# Patient Record
Sex: FEMALE | Race: WHITE | NOT HISPANIC OR LATINO
[De-identification: names, ages, dates, MRNs, and addresses within clinical notes are randomized per-mention and may not be internally consistent; named-entity substitution may affect disease eponyms.]

---

## 2018-05-24 ENCOUNTER — RESULT REVIEW (OUTPATIENT)
Age: 23
End: 2018-05-24

## 2019-01-03 ENCOUNTER — OUTPATIENT (OUTPATIENT)
Dept: OUTPATIENT SERVICES | Facility: HOSPITAL | Age: 24
LOS: 1 days | End: 2019-01-03

## 2019-01-03 VITALS
HEIGHT: 60 IN | RESPIRATION RATE: 17 BRPM | WEIGHT: 98.11 LBS | DIASTOLIC BLOOD PRESSURE: 64 MMHG | SYSTOLIC BLOOD PRESSURE: 100 MMHG | TEMPERATURE: 99 F | HEART RATE: 88 BPM

## 2019-01-03 DIAGNOSIS — O02.1 MISSED ABORTION: ICD-10-CM

## 2019-01-03 LAB
BLD GP AB SCN SERPL QL: NEGATIVE — SIGNIFICANT CHANGE UP
HCT VFR BLD CALC: 36.7 % — SIGNIFICANT CHANGE UP (ref 34.5–45)
HGB BLD-MCNC: 12.2 G/DL — SIGNIFICANT CHANGE UP (ref 11.5–15.5)
MCHC RBC-ENTMCNC: 31.2 PG — SIGNIFICANT CHANGE UP (ref 27–34)
MCHC RBC-ENTMCNC: 33.2 % — SIGNIFICANT CHANGE UP (ref 32–36)
MCV RBC AUTO: 93.9 FL — SIGNIFICANT CHANGE UP (ref 80–100)
NRBC # FLD: 0 — SIGNIFICANT CHANGE UP
PLATELET # BLD AUTO: 233 K/UL — SIGNIFICANT CHANGE UP (ref 150–400)
PMV BLD: 11.5 FL — SIGNIFICANT CHANGE UP (ref 7–13)
RBC # BLD: 3.91 M/UL — SIGNIFICANT CHANGE UP (ref 3.8–5.2)
RBC # FLD: 12.1 % — SIGNIFICANT CHANGE UP (ref 10.3–14.5)
RH IG SCN BLD-IMP: POSITIVE — SIGNIFICANT CHANGE UP
WBC # BLD: 8.59 K/UL — SIGNIFICANT CHANGE UP (ref 3.8–10.5)
WBC # FLD AUTO: 8.59 K/UL — SIGNIFICANT CHANGE UP (ref 3.8–10.5)

## 2019-01-03 NOTE — H&P PST ADULT - PROBLEM SELECTOR PLAN 1
Scheduled for DVC on 01/07/2019. Pre op instructions, famotidine given and explained. Pt verbalized understanding.

## 2019-01-03 NOTE — H&P PST ADULT - HISTORY OF PRESENT ILLNESS
22y/o  female 9 weeks pregnant, LMP 10/02/18 presents to PST for pre op evaluation stated that US done on 12/31/18 showed no fetal heart beats. Now scheduled for DVC on 01/07/19

## 2019-01-03 NOTE — H&P PST ADULT - NEGATIVE OPHTHALMOLOGIC SYMPTOMS
no blurred vision L/no discharge L/no irritation L/no loss of vision R/no loss of vision L/no lacrimation L/no photophobia/no diplopia/no pain L/no irritation R/no lacrimation R/no pain R/no blurred vision R/no discharge R

## 2019-01-03 NOTE — H&P PST ADULT - NEGATIVE CARDIOVASCULAR SYMPTOMS
no orthopnea/no peripheral edema/no chest pain/no paroxysmal nocturnal dyspnea/no dyspnea on exertion/no claudication/no palpitations

## 2019-01-03 NOTE — H&P PST ADULT - NSANTHOSAYNRD_GEN_A_CORE
No. YANNICK screening performed.  STOP BANG Legend: 0-2 = LOW Risk; 3-4 = INTERMEDIATE Risk; 5-8 = HIGH Risk

## 2019-01-03 NOTE — H&P PST ADULT - RS GEN PE MLT RESP DETAILS PC
no chest wall tenderness/good air movement/no rales/no subcutaneous emphysema/airway patent/breath sounds equal/respirations non-labored/no intercostal retractions/no rhonchi/clear to auscultation bilaterally/no wheezes

## 2019-01-03 NOTE — H&P PST ADULT - FAMILY HISTORY
Father  Still living? Yes, Estimated age: 61-70  Family history of hypertension in father, Age at diagnosis: Age Unknown

## 2019-01-04 RX ORDER — SODIUM CHLORIDE 9 MG/ML
1000 INJECTION, SOLUTION INTRAVENOUS
Qty: 0 | Refills: 0 | Status: DISCONTINUED | OUTPATIENT
Start: 2019-01-07 | End: 2019-01-08

## 2019-01-06 ENCOUNTER — TRANSCRIPTION ENCOUNTER (OUTPATIENT)
Age: 24
End: 2019-01-06

## 2019-01-07 ENCOUNTER — RESULT REVIEW (OUTPATIENT)
Age: 24
End: 2019-01-07

## 2019-01-07 ENCOUNTER — OUTPATIENT (OUTPATIENT)
Dept: OUTPATIENT SERVICES | Facility: HOSPITAL | Age: 24
LOS: 1 days | Discharge: HOME | End: 2019-01-07
Payer: COMMERCIAL

## 2019-01-07 VITALS
WEIGHT: 98.11 LBS | RESPIRATION RATE: 16 BRPM | TEMPERATURE: 98 F | OXYGEN SATURATION: 100 % | HEIGHT: 60 IN | SYSTOLIC BLOOD PRESSURE: 126 MMHG | DIASTOLIC BLOOD PRESSURE: 82 MMHG | HEART RATE: 71 BPM

## 2019-01-07 VITALS
SYSTOLIC BLOOD PRESSURE: 104 MMHG | HEART RATE: 57 BPM | RESPIRATION RATE: 18 BRPM | OXYGEN SATURATION: 99 % | DIASTOLIC BLOOD PRESSURE: 72 MMHG

## 2019-01-07 DIAGNOSIS — O02.1 MISSED ABORTION: ICD-10-CM

## 2019-01-07 LAB
BLD GP AB SCN SERPL QL: NEGATIVE — SIGNIFICANT CHANGE UP
RH IG SCN BLD-IMP: POSITIVE — SIGNIFICANT CHANGE UP

## 2019-01-07 PROCEDURE — 88304 TISSUE EXAM BY PATHOLOGIST: CPT | Mod: 26

## 2019-01-07 RX ORDER — SODIUM CHLORIDE 9 MG/ML
1000 INJECTION, SOLUTION INTRAVENOUS
Qty: 0 | Refills: 0 | Status: DISCONTINUED | OUTPATIENT
Start: 2019-01-07 | End: 2019-01-08

## 2019-01-07 NOTE — ASU DISCHARGE PLAN (ADULT/PEDIATRIC). - NOTIFY
Pain not relieved by Medications/Persistent Nausea and Vomiting/GYN Fever>100.4/Bleeding that does not stop

## 2019-01-07 NOTE — ASU DISCHARGE PLAN (ADULT/PEDIATRIC). - MEDICATION SUMMARY - MEDICATIONS TO TAKE
I will START or STAY ON the medications listed below when I get home from the hospital:  None I will START or STAY ON the medications listed below when I get home from the hospital:    Motrin 600 mg oral tablet  -- 1 tab(s) by mouth every 6 hours, As Needed - for severe pain  -- Indication: For pain    Tylenol 325 mg oral tablet  -- 3 tab(s) by mouth every 6 hours, As Needed - for severe pain  -- Indication: For pain

## 2019-01-07 NOTE — ASU DISCHARGE PLAN (ADULT/PEDIATRIC). - ACTIVITY LEVEL
nothing per vagina/no tub baths/no tampons/no douching/no intercourse/nothing per rectum/no heavy lifting

## 2019-01-07 NOTE — BRIEF OPERATIVE NOTE - PROCEDURE
<<-----Click on this checkbox to enter Procedure Dilation and curettage of uterus using suction for missed  in first trimester  2019    Active  [id number]

## 2019-01-11 LAB — SURGICAL PATHOLOGY STUDY: SIGNIFICANT CHANGE UP

## 2020-02-15 ENCOUNTER — INPATIENT (INPATIENT)
Facility: HOSPITAL | Age: 25
LOS: 2 days | Discharge: HOME | End: 2020-02-18
Attending: OBSTETRICS & GYNECOLOGY | Admitting: OBSTETRICS & GYNECOLOGY

## 2020-02-15 ENCOUNTER — TRANSCRIPTION ENCOUNTER (OUTPATIENT)
Age: 25
End: 2020-02-15

## 2020-02-15 VITALS
RESPIRATION RATE: 18 BRPM | SYSTOLIC BLOOD PRESSURE: 126 MMHG | TEMPERATURE: 98 F | DIASTOLIC BLOOD PRESSURE: 77 MMHG | HEART RATE: 81 BPM

## 2020-02-15 DIAGNOSIS — O26.899 OTHER SPECIFIED PREGNANCY RELATED CONDITIONS, UNSPECIFIED TRIMESTER: ICD-10-CM

## 2020-02-15 DIAGNOSIS — O42.10 PREMATURE RUPTURE OF MEMBRANES, ONSET OF LABOR MORE THAN 24 HOURS FOLLOWING RUPTURE, UNSPECIFIED WEEKS OF GESTATION: ICD-10-CM

## 2020-02-15 DIAGNOSIS — Z98.890 OTHER SPECIFIED POSTPROCEDURAL STATES: Chronic | ICD-10-CM

## 2020-02-15 DIAGNOSIS — Z3A.00 WEEKS OF GESTATION OF PREGNANCY NOT SPECIFIED: ICD-10-CM

## 2020-02-15 LAB
BASOPHILS # BLD AUTO: 0.01 K/UL — SIGNIFICANT CHANGE UP (ref 0–0.2)
BASOPHILS NFR BLD AUTO: 0.1 % — SIGNIFICANT CHANGE UP (ref 0–2)
BLD GP AB SCN SERPL QL: NEGATIVE — SIGNIFICANT CHANGE UP
EOSINOPHIL # BLD AUTO: 0.05 K/UL — SIGNIFICANT CHANGE UP (ref 0–0.5)
EOSINOPHIL NFR BLD AUTO: 0.7 % — SIGNIFICANT CHANGE UP (ref 0–6)
HCT VFR BLD CALC: 36.1 % — SIGNIFICANT CHANGE UP (ref 34.5–45)
HGB BLD-MCNC: 12 G/DL — SIGNIFICANT CHANGE UP (ref 11.5–15.5)
IMM GRANULOCYTES NFR BLD AUTO: 0.7 % — SIGNIFICANT CHANGE UP (ref 0–1.5)
LYMPHOCYTES # BLD AUTO: 1.14 K/UL — SIGNIFICANT CHANGE UP (ref 1–3.3)
LYMPHOCYTES # BLD AUTO: 15.1 % — SIGNIFICANT CHANGE UP (ref 13–44)
MCHC RBC-ENTMCNC: 32.7 PG — SIGNIFICANT CHANGE UP (ref 27–34)
MCHC RBC-ENTMCNC: 33.2 % — SIGNIFICANT CHANGE UP (ref 32–36)
MCV RBC AUTO: 98.4 FL — SIGNIFICANT CHANGE UP (ref 80–100)
MONOCYTES # BLD AUTO: 0.63 K/UL — SIGNIFICANT CHANGE UP (ref 0–0.9)
MONOCYTES NFR BLD AUTO: 8.3 % — SIGNIFICANT CHANGE UP (ref 2–14)
NEUTROPHILS # BLD AUTO: 5.67 K/UL — SIGNIFICANT CHANGE UP (ref 1.8–7.4)
NEUTROPHILS NFR BLD AUTO: 75.1 % — SIGNIFICANT CHANGE UP (ref 43–77)
NRBC # FLD: 0 K/UL — SIGNIFICANT CHANGE UP (ref 0–0)
PLATELET # BLD AUTO: 152 K/UL — SIGNIFICANT CHANGE UP (ref 150–400)
PMV BLD: 12.2 FL — SIGNIFICANT CHANGE UP (ref 7–13)
RBC # BLD: 3.67 M/UL — LOW (ref 3.8–5.2)
RBC # FLD: 12.4 % — SIGNIFICANT CHANGE UP (ref 10.3–14.5)
RH IG SCN BLD-IMP: POSITIVE — SIGNIFICANT CHANGE UP
WBC # BLD: 7.55 K/UL — SIGNIFICANT CHANGE UP (ref 3.8–10.5)
WBC # FLD AUTO: 7.55 K/UL — SIGNIFICANT CHANGE UP (ref 3.8–10.5)

## 2020-02-15 RX ORDER — SODIUM CHLORIDE 9 MG/ML
1000 INJECTION, SOLUTION INTRAVENOUS
Refills: 0 | Status: DISCONTINUED | OUTPATIENT
Start: 2020-02-15 | End: 2020-02-16

## 2020-02-15 RX ORDER — SODIUM CHLORIDE 9 MG/ML
1000 INJECTION, SOLUTION INTRAVENOUS
Refills: 0 | Status: DISCONTINUED | OUTPATIENT
Start: 2020-02-15 | End: 2020-02-15

## 2020-02-15 RX ORDER — IBUPROFEN 200 MG
1 TABLET ORAL
Qty: 0 | Refills: 0 | DISCHARGE

## 2020-02-15 RX ORDER — VANCOMYCIN HCL 1 G
1000 VIAL (EA) INTRAVENOUS EVERY 12 HOURS
Refills: 0 | Status: CANCELLED | OUTPATIENT
Start: 2020-02-16 | End: 2020-02-15

## 2020-02-15 RX ORDER — OXYTOCIN 10 UNIT/ML
333.33 VIAL (ML) INJECTION
Qty: 20 | Refills: 0 | Status: DISCONTINUED | OUTPATIENT
Start: 2020-02-15 | End: 2020-02-16

## 2020-02-15 RX ORDER — ACETAMINOPHEN 500 MG
3 TABLET ORAL
Qty: 0 | Refills: 0 | DISCHARGE

## 2020-02-15 RX ORDER — VANCOMYCIN HCL 1 G
VIAL (EA) INTRAVENOUS
Refills: 0 | Status: DISCONTINUED | OUTPATIENT
Start: 2020-02-15 | End: 2020-02-15

## 2020-02-15 RX ORDER — VANCOMYCIN HCL 1 G
1000 VIAL (EA) INTRAVENOUS ONCE
Refills: 0 | Status: COMPLETED | OUTPATIENT
Start: 2020-02-15 | End: 2020-02-15

## 2020-02-15 RX ORDER — OXYTOCIN 10 UNIT/ML
333.33 VIAL (ML) INJECTION
Qty: 20 | Refills: 0 | Status: DISCONTINUED | OUTPATIENT
Start: 2020-02-15 | End: 2020-02-15

## 2020-02-15 RX ADMIN — SODIUM CHLORIDE 125 MILLILITER(S): 9 INJECTION, SOLUTION INTRAVENOUS at 18:50

## 2020-02-15 RX ADMIN — SODIUM CHLORIDE 125 MILLILITER(S): 9 INJECTION, SOLUTION INTRAVENOUS at 20:23

## 2020-02-15 RX ADMIN — Medication 250 MILLIGRAM(S): at 18:50

## 2020-02-15 NOTE — OB PROVIDER TRIAGE NOTE - HISTORY OF PRESENT ILLNESS
25 y/o pt 40.1 weeks  presents to triage with c/o of ?LOF clear, odorless @ 1630. pt reports irregular, mild contractions since 2020. pt denies bleeding, n/v/d, fever or chills. pt endorses +fetal movement.   AP uncomplicated thus far.     Allergies:   Penicillin- anaphylaxis  Amoxicillin- anaphylaxis  Keflex-anaphylaxis   PMH: denies  PSH: denies  OB:   SAB x1 2018 incomplete (d&c)    2017 FT 6#6  GYN: denies  social hx: denies  Medications: PNV

## 2020-02-15 NOTE — OB PROVIDER TRIAGE NOTE - NSHPPHYSICALEXAM_GEN_ALL_CORE
Vital Signs Last 24 Hrs  T(C): 36.7 (15 Feb 2020 17:56), Max: 36.7 (15 Feb 2020 17:44)  T(F): 98.06 (15 Feb 2020 17:56), Max: 98.1 (15 Feb 2020 17:44)  HR: 83 (15 Feb 2020 18:21) (81 - 83)  BP: 114/70 (15 Feb 2020 18:21) (114/70 - 126/77)  BP(mean): --  RR: 18 (15 Feb 2020 17:44) (18 - 18)  SpO2: --    General: A&O x3  Lungs: anterior and posterior lungs clear upon auscultation bilaterally   Cardiac: R/R/R  Abdomen: soft, non tender  SSE:  +pooling  +nitrazine  SVE: 1/50/-3  TAS: vertex presentation  EFW 3203 by Leopold's Vital Signs Last 24 Hrs  T(C): 36.7 (15 Feb 2020 17:56), Max: 36.7 (15 Feb 2020 17:44)  T(F): 98.06 (15 Feb 2020 17:56), Max: 98.1 (15 Feb 2020 17:44)  HR: 83 (15 Feb 2020 18:21) (81 - 83)  BP: 114/70 (15 Feb 2020 18:21) (114/70 - 126/77)  BP(mean): --  RR: 18 (15 Feb 2020 17:44) (18 - 18)  SpO2: --    General: A&O x3  Lungs: anterior and posterior lungs clear upon auscultation bilaterally   Cardiac: R/R/R  Abdomen: soft, non tender  SSE:  +pooling  +nitrazine  SVE: 1/50/-3  TAS: vertex presentation  EFW 3203 by Leopold's    EFM in progress

## 2020-02-15 NOTE — OB PROVIDER H&P - ASSESSMENT
25 y/o pt 40.1 weeks  presents to triage with c/o of ?LOF clear, odorless @ 1630. pt reports irregular, mild contractions since 2020. pt denies bleeding, n/v/d, fever or chills. pt endorses +fetal movement.   AP uncomplicated thus far.     Allergies:   Penicillin- anaphylaxis  Amoxicillin- anaphylaxis  Keflex-anaphylaxis   PMH: denies  PSH: denies  OB:   SAB x1 2018 incomplete (d&c)    2017 FT 6#6  GYN: denies  social hx: denies  Medications: PNV    General: A&O x3  Lungs: anterior and posterior lungs clear upon auscultation bilaterally   Cardiac: R/R/R  Abdomen: soft, non tender  SSE:  +pooling  +nitrazine  +fern  SVE: /-3  TAS: vertex presentation  EFW 3203 by Leopold's      NST reactive with minimal variability, cat 2  toco no contractions noted     continue EFM     Evidence of PROM  d/w with Dr Arteaga   admit l&d  Labor @ 40.1 weeks for PROM for IOL PO Cytotec   Vancomycin for GBS prophylaxis   Clear fluids  see admission orders

## 2020-02-15 NOTE — OB PROVIDER H&P - NSHPPHYSICALEXAM_GEN_ALL_CORE
Vital Signs Last 24 Hrs  T(C): 36.7 (15 Feb 2020 17:56), Max: 36.7 (15 Feb 2020 17:44)  T(F): 98.06 (15 Feb 2020 17:56), Max: 98.1 (15 Feb 2020 17:44)  HR: 83 (15 Feb 2020 18:21) (81 - 83)  BP: 114/70 (15 Feb 2020 18:21) (114/70 - 126/77)  BP(mean): --  RR: 18 (15 Feb 2020 17:44) (18 - 18)  SpO2: --    General: A&O x3  Lungs: anterior and posterior lungs clear upon auscultation bilaterally   Cardiac: R/R/R  Abdomen: soft, non tender  SSE:  +pooling  +nitrazine  +fern  SVE: 1/50/-3  TAS: vertex presentation  EFW 3203 by Leopold's    NST with minimal variability, cat 2  toco no contractions noted

## 2020-02-15 NOTE — CHART NOTE - NSCHARTNOTEFT_GEN_A_CORE
R4 OB Progress Note    Patient seen and evaluated at bedside.  c/o increasing contraction pain.  desires epidural    T(C): 36.9 (02-15-20 @ 22:21), Max: 36.9 (02-15-20 @ 22:21)  HR: 76 (02-15-20 @ 22:21) (75 - 85)  BP: 121/81 (02-15-20 @ 22:21) (111/84 - 126/77)  RR: 17 (02-15-20 @ 22:21) (16 - 18)  SpO2: 100% (02-15-20 @ 22:21) (100% - 100%)    SVE: 5/80/-2       EFM: 135/mod abbie/+accel,-decel  [place]:  ctx q2-3 mins    A/P 24y P1 admitted for IOL for PROM  -Labor: s/p PO cytotec.  for epidural and expectant management.  anticipate   -Fetus: category 1 tracing  -GBS positive: continue vancomycin for prophylaxis   -Analgesia: for epidural once transferred to L&D floor    Sapna Lyn PGY-4  d/w Dr Damico in  house

## 2020-02-15 NOTE — OB PROVIDER TRIAGE NOTE - NSOBPROVIDERNOTE_OBGYN_ALL_OB_FT
23 y/o pt 40.1 weeks  presents to triage with c/o of ?LOF clear, odorless @ 1630. pt reports irregular, mild contractions since 2020. pt denies bleeding, n/v/d, fever or chills. pt endorses +fetal movement.   AP uncomplicated thus far.     Allergies:   Penicillin- anaphylaxis  Amoxicillin- anaphylaxis  Keflex-anaphylaxis   PMH: denies  PSH: denies  OB:   SAB x1 2018 incomplete (d&c)    2017 FT 6#6  GYN: denies  social hx: denies  Medications: PNV    General: A&O x3  Lungs: anterior and posterior lungs clear upon auscultation bilaterally   Cardiac: R/R/R  Abdomen: soft, non tender  SSE:  +pooling  +nitrazine  SVE: 50/-3  TAS: vertex presentation  EFW 3203 by Leopold's      NST reactive with minimal variability, cat 2  toco no contractions noted     continue EFM     Evidence of PROM  d/w with Dr Arteaga   admit l&d  Labor @ 40.1 weeks for PROM for IOL PO Cytotec   Vancomycin for GBS prophylaxis   Clear fluids  see admission orders 25 y/o pt 40.1 weeks  presents to triage with c/o of ?LOF clear, odorless @ 1630. pt reports irregular, mild contractions since 2020. pt denies bleeding, n/v/d, fever or chills. pt endorses +fetal movement.   AP uncomplicated thus far.     Allergies:   Penicillin- anaphylaxis  Amoxicillin- anaphylaxis  Keflex-anaphylaxis   PMH: denies  PSH: denies  OB:   SAB x1 2018 incomplete (d&c)    2017 FT 6#6  GYN: denies  social hx: denies  Medications: PNV    General: A&O x3  Lungs: anterior and posterior lungs clear upon auscultation bilaterally   Cardiac: R/R/R  Abdomen: soft, non tender  SSE:  +pooling  +nitrazine  +fern  SVE: /-3  TAS: vertex presentation  EFW 3203 by Leopold's      NST reactive with minimal variability, cat 2  toco no contractions noted     continue EFM     Evidence of PROM  d/w with Dr Arteaga   admit l&d  Labor @ 40.1 weeks for PROM for IOL PO Cytotec   Vancomycin for GBS prophylaxis   Clear fluids  see admission orders

## 2020-02-15 NOTE — OB PROVIDER H&P - PROBLEM SELECTOR PLAN 1
Labor @ 40.1 weeks for PROM for IOL PO Cytotec   Vancomycin for GBS prophylaxis   Clear fluids  see admission orders

## 2020-02-16 LAB — T PALLIDUM AB TITR SER: NEGATIVE — SIGNIFICANT CHANGE UP

## 2020-02-16 RX ORDER — IBUPROFEN 200 MG
600 TABLET ORAL EVERY 6 HOURS
Refills: 0 | Status: DISCONTINUED | OUTPATIENT
Start: 2020-02-16 | End: 2020-02-18

## 2020-02-16 RX ORDER — BENZOCAINE 10 %
1 GEL (GRAM) MUCOUS MEMBRANE EVERY 6 HOURS
Refills: 0 | Status: DISCONTINUED | OUTPATIENT
Start: 2020-02-16 | End: 2020-02-18

## 2020-02-16 RX ORDER — HYDROCORTISONE 1 %
1 OINTMENT (GRAM) TOPICAL EVERY 6 HOURS
Refills: 0 | Status: DISCONTINUED | OUTPATIENT
Start: 2020-02-16 | End: 2020-02-18

## 2020-02-16 RX ORDER — OXYCODONE HYDROCHLORIDE 5 MG/1
5 TABLET ORAL
Refills: 0 | Status: DISCONTINUED | OUTPATIENT
Start: 2020-02-16 | End: 2020-02-18

## 2020-02-16 RX ORDER — DIBUCAINE 1 %
1 OINTMENT (GRAM) RECTAL EVERY 6 HOURS
Refills: 0 | Status: DISCONTINUED | OUTPATIENT
Start: 2020-02-16 | End: 2020-02-18

## 2020-02-16 RX ORDER — LANOLIN
1 OINTMENT (GRAM) TOPICAL EVERY 6 HOURS
Refills: 0 | Status: DISCONTINUED | OUTPATIENT
Start: 2020-02-16 | End: 2020-02-18

## 2020-02-16 RX ORDER — AER TRAVELER 0.5 G/1
1 SOLUTION RECTAL; TOPICAL EVERY 4 HOURS
Refills: 0 | Status: DISCONTINUED | OUTPATIENT
Start: 2020-02-16 | End: 2020-02-18

## 2020-02-16 RX ORDER — TETANUS TOXOID, REDUCED DIPHTHERIA TOXOID AND ACELLULAR PERTUSSIS VACCINE, ADSORBED 5; 2.5; 8; 8; 2.5 [IU]/.5ML; [IU]/.5ML; UG/.5ML; UG/.5ML; UG/.5ML
0.5 SUSPENSION INTRAMUSCULAR ONCE
Refills: 0 | Status: DISCONTINUED | OUTPATIENT
Start: 2020-02-16 | End: 2020-02-18

## 2020-02-16 RX ORDER — OXYCODONE HYDROCHLORIDE 5 MG/1
5 TABLET ORAL ONCE
Refills: 0 | Status: DISCONTINUED | OUTPATIENT
Start: 2020-02-16 | End: 2020-02-18

## 2020-02-16 RX ORDER — ACETAMINOPHEN 500 MG
3 TABLET ORAL
Qty: 0 | Refills: 0 | DISCHARGE
Start: 2020-02-16

## 2020-02-16 RX ORDER — SODIUM CHLORIDE 9 MG/ML
3 INJECTION INTRAMUSCULAR; INTRAVENOUS; SUBCUTANEOUS EVERY 8 HOURS
Refills: 0 | Status: DISCONTINUED | OUTPATIENT
Start: 2020-02-16 | End: 2020-02-17

## 2020-02-16 RX ORDER — PRAMOXINE HYDROCHLORIDE 150 MG/15G
1 AEROSOL, FOAM RECTAL EVERY 4 HOURS
Refills: 0 | Status: DISCONTINUED | OUTPATIENT
Start: 2020-02-16 | End: 2020-02-18

## 2020-02-16 RX ORDER — SIMETHICONE 80 MG/1
80 TABLET, CHEWABLE ORAL EVERY 4 HOURS
Refills: 0 | Status: DISCONTINUED | OUTPATIENT
Start: 2020-02-16 | End: 2020-02-18

## 2020-02-16 RX ORDER — DIPHENHYDRAMINE HCL 50 MG
25 CAPSULE ORAL EVERY 6 HOURS
Refills: 0 | Status: DISCONTINUED | OUTPATIENT
Start: 2020-02-16 | End: 2020-02-18

## 2020-02-16 RX ORDER — IBUPROFEN 200 MG
1 TABLET ORAL
Qty: 0 | Refills: 0 | DISCHARGE
Start: 2020-02-16

## 2020-02-16 RX ORDER — IBUPROFEN 200 MG
600 TABLET ORAL EVERY 6 HOURS
Refills: 0 | Status: COMPLETED | OUTPATIENT
Start: 2020-02-16 | End: 2021-01-14

## 2020-02-16 RX ORDER — ACETAMINOPHEN 500 MG
975 TABLET ORAL
Refills: 0 | Status: DISCONTINUED | OUTPATIENT
Start: 2020-02-16 | End: 2020-02-18

## 2020-02-16 RX ORDER — GLYCERIN ADULT
1 SUPPOSITORY, RECTAL RECTAL AT BEDTIME
Refills: 0 | Status: DISCONTINUED | OUTPATIENT
Start: 2020-02-16 | End: 2020-02-18

## 2020-02-16 RX ORDER — MAGNESIUM HYDROXIDE 400 MG/1
30 TABLET, CHEWABLE ORAL
Refills: 0 | Status: DISCONTINUED | OUTPATIENT
Start: 2020-02-16 | End: 2020-02-18

## 2020-02-16 RX ORDER — KETOROLAC TROMETHAMINE 30 MG/ML
30 SYRINGE (ML) INJECTION ONCE
Refills: 0 | Status: DISCONTINUED | OUTPATIENT
Start: 2020-02-16 | End: 2020-02-16

## 2020-02-16 RX ORDER — OXYTOCIN 10 UNIT/ML
333.33 VIAL (ML) INJECTION
Qty: 20 | Refills: 0 | Status: DISCONTINUED | OUTPATIENT
Start: 2020-02-16 | End: 2020-02-17

## 2020-02-16 RX ADMIN — SODIUM CHLORIDE 3 MILLILITER(S): 9 INJECTION INTRAMUSCULAR; INTRAVENOUS; SUBCUTANEOUS at 05:51

## 2020-02-16 RX ADMIN — Medication 975 MILLIGRAM(S): at 21:12

## 2020-02-16 RX ADMIN — Medication 1 APPLICATION(S): at 05:49

## 2020-02-16 RX ADMIN — Medication 600 MILLIGRAM(S): at 09:57

## 2020-02-16 RX ADMIN — AER TRAVELER 1 APPLICATION(S): 0.5 SOLUTION RECTAL; TOPICAL at 05:49

## 2020-02-16 RX ADMIN — Medication 1 TABLET(S): at 09:57

## 2020-02-16 RX ADMIN — Medication 600 MILLIGRAM(S): at 10:30

## 2020-02-16 RX ADMIN — Medication 975 MILLIGRAM(S): at 15:11

## 2020-02-16 RX ADMIN — Medication 600 MILLIGRAM(S): at 16:36

## 2020-02-16 RX ADMIN — Medication 975 MILLIGRAM(S): at 06:20

## 2020-02-16 RX ADMIN — Medication 975 MILLIGRAM(S): at 22:12

## 2020-02-16 RX ADMIN — Medication 975 MILLIGRAM(S): at 14:43

## 2020-02-16 RX ADMIN — PRAMOXINE HYDROCHLORIDE 1 APPLICATION(S): 150 AEROSOL, FOAM RECTAL at 05:51

## 2020-02-16 RX ADMIN — Medication 975 MILLIGRAM(S): at 05:50

## 2020-02-16 RX ADMIN — SODIUM CHLORIDE 3 MILLILITER(S): 9 INJECTION INTRAMUSCULAR; INTRAVENOUS; SUBCUTANEOUS at 13:23

## 2020-02-16 NOTE — DISCHARGE NOTE OB - MATERIALS PROVIDED
MMR Vaccination (VIS Pub Date: 2012)/[facility] [place] Screening Program/Birth Certificate Instructions/[facility] Hearing Screen Program/Shaken Baby Prevention Handout/Tdap Vaccination (VIS Pub Date: 2012)/Breastfeeding Mother’s Support Group Information/Guide to Postpartum Care/Vaccinations/Breastfeeding Guide and Packet/  Immunization Record/Breastfeeding Log

## 2020-02-16 NOTE — OB RN DELIVERY SUMMARY - NS_SEPSISRSKCALC_OBGYN_ALL_OB_FT
EOS calculated successfully. EOS Risk Factor: 0.5/1000 live births ([facility] national incidence); GA=40w2d; Temp=98.1; ROM=8.117; GBS='Positive'; Antibiotics='No antibiotics or any antibiotics < 2 hrs prior to birth'

## 2020-02-16 NOTE — DISCHARGE NOTE OB - PATIENT PORTAL LINK FT
You can access the FollowMyHealth Patient Portal offered by [facility] by registering at the following website: [email or web address]. By joining [facility]’s FollowMyHealth portal, you will also be able to view your health information using other applications (apps) compatible with our system.

## 2020-02-16 NOTE — DISCHARGE NOTE OB - CARE PLAN
Principal Discharge DX:	Normal vaginal delivery  Goal:	RECOVERY  Assessment and plan of treatment:	REGULAR DIET  AMBULATION ENCOURAGED

## 2020-02-16 NOTE — DISCHARGE NOTE OB - CARE PROVIDER_API CALL
Herb Trotter)  Obstetrics and Gynecology  [address], [place] [address]  Phone: [phone]  Fax: [phone]  Follow Up Time:

## 2020-02-16 NOTE — OB PROVIDER DELIVERY SUMMARY - NSPROVIDERDELIVERYNOTE_OBGYN_ALL_OB_FT
Spontaneous vaginal delivery of liveborn infant from SONAM position. Head, shoulders, and body delivered easily. Infant was suctioned. No mec. Delayed cord clamping and infant was passed to mother. Cord clamped and cut. Placenta delivered intact with a 3 vessel cord. Fundal massage was given and uterine fundus was found to be firm. Vaginal exam revealed an intact cervix, vaginal walls and sulci. Patient had a 2nd degree laceration in the perineum that was repaired with 2.0 chromic suture. Excellent hemostasis was noted. Patient was stable and went to recovery. Count was correct x 2.

## 2020-02-16 NOTE — OB RN DELIVERY SUMMARY - NS_ADMITLABOR_OBGYN_ALL_OB
Quality 130: Documentation Of Current Medications In The Medical Record: Current Medications Documented Yes Quality 226: Preventive Care And Screening: Tobacco Use: Screening And Cessation Intervention: Patient screened for tobacco use and is an ex/non-smoker Quality 431: Preventive Care And Screening: Unhealthy Alcohol Use - Screening: Patient screened for unhealthy alcohol use using a single question and scores less than 2 times per year Quality 110: Preventive Care And Screening: Influenza Immunization: Influenza Immunization not Administered because Patient Refused. Detail Level: Detailed Quality 131: Pain Assessment And Follow-Up: Pain assessment using a standardized tool is documented as negative, no follow-up plan required

## 2020-02-16 NOTE — DISCHARGE NOTE OB - MEDICATION SUMMARY - MEDICATIONS TO TAKE
I will START or STAY ON the medications listed below when I get home from the hospital:    acetaminophen 325 mg oral tablet  -- 3 tab(s) by mouth   -- Indication: For Normal vaginal delivery    ibuprofen 600 mg oral tablet  -- 1 tab(s) by mouth every 6 hours  -- Indication: For Normal vaginal delivery    Prenatal Multivitamins with Folic Acid 1 mg oral tablet  -- 1 tab(s) by mouth once a day  -- Indication: For Normal vaginal delivery

## 2020-02-17 RX ADMIN — Medication 600 MILLIGRAM(S): at 16:16

## 2020-02-17 RX ADMIN — Medication 600 MILLIGRAM(S): at 15:31

## 2020-02-17 RX ADMIN — Medication 975 MILLIGRAM(S): at 03:53

## 2020-02-17 RX ADMIN — Medication 1 TABLET(S): at 12:04

## 2020-02-17 RX ADMIN — Medication 600 MILLIGRAM(S): at 01:10

## 2020-02-17 RX ADMIN — Medication 975 MILLIGRAM(S): at 18:10

## 2020-02-17 RX ADMIN — Medication 600 MILLIGRAM(S): at 05:25

## 2020-02-17 RX ADMIN — Medication 600 MILLIGRAM(S): at 06:25

## 2020-02-17 RX ADMIN — Medication 600 MILLIGRAM(S): at 21:04

## 2020-02-17 RX ADMIN — Medication 975 MILLIGRAM(S): at 02:53

## 2020-02-17 RX ADMIN — Medication 600 MILLIGRAM(S): at 00:10

## 2020-02-17 RX ADMIN — Medication 975 MILLIGRAM(S): at 17:25

## 2020-02-17 RX ADMIN — Medication 600 MILLIGRAM(S): at 21:45

## 2020-02-17 NOTE — LACTATION INITIAL EVALUATION - NS LACT CON REASON FOR REQ
multiparous mom/reviewed  deeeper latch  discussed  signs  of  effective  feeding and  swallowing.  instructed  to offer both  breast at a feeding ,feed on cue and safe  skin to skin.  reviewed  breastfeeding  log  and daily  nbn  goals.  offered assistance  .  mother states nbn  feeding  well./general questions without assessment

## 2020-02-18 VITALS
DIASTOLIC BLOOD PRESSURE: 76 MMHG | RESPIRATION RATE: 16 BRPM | TEMPERATURE: 98 F | SYSTOLIC BLOOD PRESSURE: 125 MMHG | HEART RATE: 56 BPM | OXYGEN SATURATION: 99 %

## 2020-02-18 RX ADMIN — Medication 975 MILLIGRAM(S): at 11:14

## 2020-02-18 RX ADMIN — Medication 975 MILLIGRAM(S): at 06:15

## 2020-02-18 RX ADMIN — Medication 975 MILLIGRAM(S): at 05:30

## 2020-02-18 RX ADMIN — Medication 600 MILLIGRAM(S): at 03:25

## 2020-02-18 RX ADMIN — Medication 600 MILLIGRAM(S): at 04:00

## 2020-02-18 RX ADMIN — Medication 975 MILLIGRAM(S): at 12:13

## 2020-08-25 NOTE — OB RN DELIVERY SUMMARY - NSBABYASEPSISRSK_OBGYN_N_OB_NU
Bedside shift change report given to Rudi Mclaughlin RN (oncoming nurse) by ROSLYN Obrien (offgoing nurse). Report included the following information SBAR, Kardex, OR Summary, Procedure Summary, Intake/Output, MAR, Accordion and Recent Results. 0.15

## 2020-10-01 ENCOUNTER — RESULT REVIEW (OUTPATIENT)
Age: 25
End: 2020-10-01

## 2021-05-17 NOTE — OB RN DELIVERY SUMMARY - BABY A SEX
[FreeTextEntry1] : Very pleasant 55-year-old gentleman who presents for follow-up of right lower pole renal mass concerning for renal cell carcinoma, right ureteral stone, kidney stones\par -MRI images reviewed with the patient demonstrating a 4.2 cm right lower pole renal mass highly suspicious for renal cell carcinoma\par -We discussed different treatment options for a small renal mass concerning for renal cell carcinoma, including observation, biopsy with or without ablation, partial versus radical nephrectomy.  We discussed different ways of performing a partial versus radical nephrectomy, including open, laparoscopic, robotic approaches\par -Given the highly suspicious nature of the lesion on CT scan and MRI I recommended that he undergo treatment for this lesion.  He is interested in a partial nephrectomy I have provided him with a reference of a colleague for further discuss laparoscopic partial nephrectomy\par -All questions answered to apparent satisfaction\par -Patient understands that he can call the office with any questions as they arise
Male

## 2021-11-28 ENCOUNTER — RESULT REVIEW (OUTPATIENT)
Age: 26
End: 2021-11-28

## 2021-12-27 ENCOUNTER — RESULT REVIEW (OUTPATIENT)
Age: 26
End: 2021-12-27

## 2022-03-27 ENCOUNTER — RESULT REVIEW (OUTPATIENT)
Age: 27
End: 2022-03-27

## 2022-03-28 ENCOUNTER — APPOINTMENT (OUTPATIENT)
Dept: OBGYN | Facility: CLINIC | Age: 27
End: 2022-03-28
Payer: COMMERCIAL

## 2022-03-28 PROCEDURE — 99213 OFFICE O/P EST LOW 20 MIN: CPT | Mod: 25

## 2022-03-28 PROCEDURE — 76376 3D RENDER W/INTRP POSTPROCES: CPT

## 2022-03-28 PROCEDURE — 76856 US EXAM PELVIC COMPLETE: CPT | Mod: 59

## 2022-03-28 PROCEDURE — 99000 SPECIMEN HANDLING OFFICE-LAB: CPT

## 2022-03-28 PROCEDURE — 76830 TRANSVAGINAL US NON-OB: CPT

## 2022-04-11 ENCOUNTER — APPOINTMENT (OUTPATIENT)
Dept: OBGYN | Facility: CLINIC | Age: 27
End: 2022-04-11
Payer: COMMERCIAL

## 2022-04-11 PROCEDURE — 81002 URINALYSIS NONAUTO W/O SCOPE: CPT

## 2022-04-11 PROCEDURE — 81025 URINE PREGNANCY TEST: CPT

## 2022-04-11 PROCEDURE — 76376 3D RENDER W/INTRP POSTPROCES: CPT

## 2022-04-11 PROCEDURE — 36415 COLL VENOUS BLD VENIPUNCTURE: CPT

## 2022-04-11 PROCEDURE — 99214 OFFICE O/P EST MOD 30 MIN: CPT | Mod: 25

## 2022-04-11 PROCEDURE — 76856 US EXAM PELVIC COMPLETE: CPT | Mod: 59

## 2022-04-11 PROCEDURE — 76830 TRANSVAGINAL US NON-OB: CPT

## 2022-04-21 PROBLEM — Z00.00 ENCOUNTER FOR PREVENTIVE HEALTH EXAMINATION: Status: ACTIVE | Noted: 2022-04-21

## 2022-05-03 ENCOUNTER — APPOINTMENT (OUTPATIENT)
Dept: ANTEPARTUM | Facility: CLINIC | Age: 27
End: 2022-05-03
Payer: COMMERCIAL

## 2022-05-03 ENCOUNTER — ASOB RESULT (OUTPATIENT)
Age: 27
End: 2022-05-03

## 2022-05-03 ENCOUNTER — LABORATORY RESULT (OUTPATIENT)
Age: 27
End: 2022-05-03

## 2022-05-03 PROCEDURE — 76813 OB US NUCHAL MEAS 1 GEST: CPT

## 2022-05-03 PROCEDURE — 36416 COLLJ CAPILLARY BLOOD SPEC: CPT

## 2022-05-03 PROCEDURE — 76801 OB US < 14 WKS SINGLE FETUS: CPT | Mod: 59

## 2022-06-02 ENCOUNTER — APPOINTMENT (OUTPATIENT)
Dept: OBGYN | Facility: CLINIC | Age: 27
End: 2022-06-02
Payer: COMMERCIAL

## 2022-06-02 ENCOUNTER — TRANSCRIPTION ENCOUNTER (OUTPATIENT)
Age: 27
End: 2022-06-02

## 2022-06-02 PROCEDURE — 81002 URINALYSIS NONAUTO W/O SCOPE: CPT

## 2022-06-02 PROCEDURE — 76815 OB US LIMITED FETUS(S): CPT

## 2022-06-02 PROCEDURE — 36415 COLL VENOUS BLD VENIPUNCTURE: CPT

## 2022-06-15 ENCOUNTER — APPOINTMENT (OUTPATIENT)
Dept: ANTEPARTUM | Facility: CLINIC | Age: 27
End: 2022-06-15
Payer: COMMERCIAL

## 2022-06-15 ENCOUNTER — ASOB RESULT (OUTPATIENT)
Age: 27
End: 2022-06-15

## 2022-06-15 PROCEDURE — 81002 URINALYSIS NONAUTO W/O SCOPE: CPT

## 2022-06-27 ENCOUNTER — APPOINTMENT (OUTPATIENT)
Dept: ANTEPARTUM | Facility: CLINIC | Age: 27
End: 2022-06-27

## 2022-07-11 ENCOUNTER — APPOINTMENT (OUTPATIENT)
Dept: OBGYN | Facility: CLINIC | Age: 27
End: 2022-07-11

## 2022-07-11 PROCEDURE — 99213 OFFICE O/P EST LOW 20 MIN: CPT

## 2022-07-11 PROCEDURE — 0502F SUBSEQUENT PRENATAL CARE: CPT

## 2022-07-11 PROCEDURE — 81002 URINALYSIS NONAUTO W/O SCOPE: CPT

## 2022-08-08 ENCOUNTER — APPOINTMENT (OUTPATIENT)
Dept: OBGYN | Facility: CLINIC | Age: 27
End: 2022-08-08

## 2022-08-08 PROCEDURE — 76815 OB US LIMITED FETUS(S): CPT

## 2022-08-08 PROCEDURE — 81002 URINALYSIS NONAUTO W/O SCOPE: CPT

## 2022-08-08 PROCEDURE — 99213 OFFICE O/P EST LOW 20 MIN: CPT

## 2022-08-08 PROCEDURE — 0502F SUBSEQUENT PRENATAL CARE: CPT

## 2022-08-08 PROCEDURE — 36415 COLL VENOUS BLD VENIPUNCTURE: CPT

## 2022-08-22 NOTE — H&P PST ADULT - PULMONARY EMBOLUS
Additional Notes: Patient consent was obtained to proceed with the visit and recommended plan of care after discussion of all risks and benefits, including the risks of COVID-19 exposure. Detail Level: Simple no

## 2022-09-01 ENCOUNTER — APPOINTMENT (OUTPATIENT)
Dept: OBGYN | Facility: CLINIC | Age: 27
End: 2022-09-01

## 2023-03-06 NOTE — OB PROVIDER TRIAGE NOTE - NS_ATTENDINFORMED_OBGYN_ALL_OB
[facility] Note     Patient Name: Breana Linder  Encounter Date: 3/6/2023     Have you been cared for by a [place] [id number] Dermatologist in the last 3 years and, if so, which description applies to you? Yes  I have been here within the last 3 years, and my medical history has NOT changed since that time  I am FEMALE/of child-bearing potential     REVIEW OF SYSTEMS:  Have you recently had or currently have any of the following? · No changes in my recent health  PAST MEDICAL HISTORY:  Have you personally ever had or currently have any of the following? If "YES," then please provide more detail  · No changes in my medical history  FAMILY HISTORY:  Any "first degree relatives" (parent, brother, sister, or child) with the following? • No changes in my family's known health  PATIENT EXPERIENCE:    • Do you want the Dermatologist to perform a COMPLETE skin exam today including a clinical examination under the "bra and underwear" areas? NO  • If necessary, do we have your permission to call and leave a detailed message on your Preferred Phone number that includes your specific medical information? Yes      No Known Allergies   Current Outpatient Medications:   •  hydrocortisone 2 5 % lotion, APPLY TO SCALP ONCE DAILY FOR 1 WEEK, THEN ONCE WEEKLY FOR 4 WEEKS, Disp: 118 mL, Rfl: 2  •  ketoconazole (NIZORAL) 2 % shampoo, Daily for 2 weeks straight and then on "Mondays, Wednesdays and Fridays":  Lather into scalp and skin on face, neck, chest, and back; leave on for 5 minutes and then rinse off completely  , Disp: 120 mL, Rfl: 12          • Whom besides the patient is providing clinical information about today's encounter?   o Parent/Guardian provided history (due to age/developmental stage of patient)    Physical Exam and Assessment/Plan by Diagnosis:    SCREENING FOR SKIN CONDITION; REPORTED HISTORY OF SEBORRHEIC DERMATITIS    Physical Exam:  • Anatomic Location Affected: scalp  • Morphological Description: Scalp clear  • Pertinent Positives:  • Pertinent Negatives: No erythema, scale, or cicatrix  No xerosis noted  Additional History of Present Condition:  Patient's father states had lice when younger and then started with dry scalp     Assessment and Plan:  Based on a thorough discussion of this condition and the management approach to it (including a comprehensive discussion of the known risks, side effects and potential benefits of treatment), the patient (family) agrees to implement the following specific plan:  • No clinical evidence of seborrheic dermatitis today  • Refilled Ketoconazole shampoo to use :  Lather into scalp and skin on Neck;  leave on for 5 minutes and then rinse off completely  Seborrheic Dermatitis   Seborrheic dermatitis is a common, chronic or relapsing form of eczema/dermatitis that mainly affects the sebaceous, gland-rich regions of the scalp, face, and trunk  There are infantile and adult forms of seborrhoeic dermatitis  It is sometimes associated with psoriasis and, in that clinical scenario, may be referred to as "sebo-psoriasis "  Seborrheic dermatitis is also known as "seborrheic eczema "  Dandruff (also called "pityriasis capitis") is an uninflamed form of seborrhoeic dermatitis  Dandruff presents as bran-like scaly patches scattered within hair-bearing areas of the scalp  In an infant, this condition may be referred to as "cradle cap "  The cause of seborrheic dermatitis is not completely understood  It is associated with proliferation of various species of the skin commensal Malassezia, in its yeast (non-pathogenic) form  Its metabolites (such as the fatty acids oleic acid, malssezin, and indole-3-carbaldehyde) may cause an inflammatory reaction  Differences in skin barrier lipid content and function may account for individual presentations      Infantile Seborrheic Dermatitis  Infantile seborrheic dermatitis affects babies under the age of 3 months and usually resolves by 1012 months of age  Infantile seborrheic dermatitis causes "cradle cap" (diffuse, greasy scaling on scalp)  The rash may spread to affect armpit and groin folds (a type of "napkin dermatitis")  There may be associated salmon-pink colored patches that may flake or peel  The rash in this case is usually not especially itchy, so the baby often appears undisturbed by the rash, even when more generalized  Adult Seborrheic Dermatitis  Adult seborrheic dermatitis tends to begin in late adolescence; prevalence is greatest in young adults and in the elderly  It is more common in males than in females  The following factors are sometimes associated with severe adult seborrheic dermatitis:  • Oily skin  • Familial tendency to seborrhoeic dermatitis or a family history of psoriasis  • Immunosuppression: organ transplant recipient, human immunodeficiency virus (HIV) infection and patients with lymphoma  • Neurological and psychiatric diseases: Parkinson disease, tardive dyskinesia, depression, epilepsy, facial nerve palsy, spinal cord injury and congenital disorders such as Down syndrome  • Treatment for psoriasis with psoralen and ultraviolet A (PUVA) therapy  • Lack of sleep  • Stressful events  In adults, seborrheic dermatitis may typically affect the scalp, face (creases around the nose, behind ears, within eyebrows) and upper trunk   Typical clinical features include:  • Winter flares, improving in summer following sun exposure  • Minimal itch most of the time  • Combination oily and dry mid-facial skin  • Ill-defined localized scaly patches or diffuse scale in the scalp  • Blepharitis; scaly red eyelid margins  • [place]-pink, thin, scaly, and ill-defined plaques in skin folds on both sides of the face  • Petal or ring-shaped flaky patches on hair-line and on anterior chest  • Rash in armpits, under the breasts, in the groin folds and genital creases  • Superficial folliculitis (inflamed hair follicles) on cheeks and upper trunk    Seborrheic dermatitis is diagnosed by its clinical appearance and behavior  Skin biopsy may be helpful but is rarely necessary to make this diagnosis  HAND DERMATITIS    Physical Exam:  • Anatomic Location Affected:  Hands  • Morphological Description:    • Body Surface Area Today:    • Overall Severity: mild  • Pertinent Positives: Mild eczematous dermatitis of hands/fingers with morphology of irritant contact hand dermatitis  • Pertinent Negatives: Additional History of Present Condition:      Assessment and Plan:  Based on a thorough discussion of this condition and the management approach to it (including a comprehensive discussion of the known risks, side effects and potential benefits of treatment), the patient (family) agrees to implement the following specific plan:  • Start triamcinolone ointment twice a day for up to 14 days then one week break in between use     Assessment and Plan:   Atopic Dermatitis is a chronic, itchy skin condition that is very common in children but may occur at any age  It is also known as “eczema” or “atopic eczema ” It is the most common form of dermatitis  Atopic dermatitis (which I did not diagnose today) usually occurs in people who have an “atopic tendency ”  This means they may develop any or all of these closely linked conditions: Atopic dermatitis, asthma, hay fever (allergic rhinitis), eosinophilic esophagitis, and gastroenteritis  Often these conditions run within families with a parent, child or sibling also affected  A family history of asthma, eczema or hay fever is particularly useful in diagnosing atopic dermatitis in infants  Atopic dermatitis arises because of a complex interaction of genetic and environmental factors   These include defects in skin barrier function making the skin more susceptible to irritation by soap and other contact irritants, the weather, temperature and non-specific triggers  There is also an element of immune system dysregulation that is often present  By definition, it is chronic and has a "waxing-waning" nature; flares should be expected but with good education and treatment strategies can be minimized  Some specific tips we discussed:  • Dry skin care  • Using only mild cleansers (hypoallergenic and without fragrances) and fragrance free detergent (not “unscented” products which contain a masking agent); we discussed avoiding irritants/fragranced products  • The importance of regular application of moisturizers daily (at least 3 times a day)  • The known and theoretical side effects of steroids at length, including but not limited to atrophy of skin and increased pressure in eye (glaucoma) and clouding of the eye's lens (cataracts) if used in or around the eye for extended durations  • The specific over-the-counter interventions and medications  • Side effects, risks and benefits of topical and oral medications discussed  • After lengthy discussion of etiology and treatment options, we decided to implement the following personalized treatment plan:      EDUCATION AS INTERVENTION! WHAT IS ATOPIC DERMATITIS? Atopic dermatitis (also called “eczema”) is a condition of the skin where the skin is dry, red, and itchy  The main function of the skin is to provide a barrier from the environment and is also the first defense of the immune system  In atopic dermatitis the skin barrier is decreased or disrupted, and the skin is easily irritated  As a result, moisture escapes the skin more easily, and environmental allergens and microbes can enter the skin more easily  Consequently, the skin's immune system is altered  If there are increased allergic type cells in the skin, the skin may become red and “hyper-excitable ” This leads to itching and a subsequent rash  WHY DO PEOPLE GET ATOPIC DERMATITIS?   There is no single answer because many factors are involved  It is likely a combination of genetic makeup and environmental triggers and/or exposures  Excessive drying or sweating of the skin, Irritating soaps, dust mites, and pet dander are some of the more common triggers  There is no blood test that can be done to confirm this diagnosis  The history and appearance of the skin is usually sufficient for a diagnosis  However, in some cases if the rash does not fit with the history or respond appropriately to treatment, a skin biopsy may be helpful  Many children do outgrow atopic dermatitis or get better; however, many continue to have sensitive skin into adulthood  Asthma and hay fever are often seen in many patients with atopic dermatitis; however, asthma flares do not necessarily occur at the same time as skin flares  PREVENTING FLARES OF ATOPIC DERMATITIS  The first step is to maintain the skin's barrier function  Keep the skin well moisturized  Avoid irritants and triggers  Use prescribed medicine when there are red or rough areas to help the skin to return to normal as quickly as possible  Try to limit scratching  If you keep the skin well moisturized, and avoid coming in contact with things you know irritate your child's skin, there will be less flares  However, some flares of atopic dermatitis are beyond your control  You should work with your health care provider to come up with a plan that minimizes flares while minimizing long term use of medications that suppress the immune system  WHAT ARE SOME OF THE TRIGGERS? Triggers are different for different people  The most common triggers are:  • Heat and sweat for some individuals, cold weather for others  • House dust mites, pet fur  • Wool; synthetic fabrics like nylon; dyed fabrics  • Tobacco smoke   • Fragrances in: shampoos, soaps, lotions, laundry detergents, fabric softeners  • Saliva or prolonged exposure to water  WHAT ABOUT FOOD ALLERGIES?   This is a very controversial topic, as many believe that food allergies are responsible for skin flares  In some cases, specific foods may cause worsening of atopic dermatitis; however this occurs in a minority of cases and usually happens within a few hours of ingestion  While food allergy is more common in children with eczema, foods are specific triggers for flares in only a small percentage of children  If you notice that the skin flares after certain foods you can see if eliminating one food at time makes a difference, as long as your child can still enjoy a well-balanced diet  There are blood (RAST) and skin (PRICK) tests that can check for allergies, but they are often positive in children who are not truly allergic  Therefore it is important that you work with your allergist and dermatologist to determine which foods are relevant and causing true symptoms  Extreme food elimination diets without the guidance of your doctor, which have become more popular in recent years, may even result in worsening of the skin rash due to malnutrition and avoidance of essential nutrients  TREATMENT  Treatments are aimed at minimizing exposure to irritating factors and decreasing  the skin inflammation which results in an itchy rash  There are many different treatment options, which depend on your child's rash, its location, and severity  Topical treatments include corticosteroids and steroid-like creams such as Protopic, Elidel, and Eucrisa, which are believed to not thin the skin  Please read the discussions below regarding risks and benefits of all of these creams  Occasionally bacterial or viral infections can occur which flare the skin and require oral and/or topical antibiotics or antivirals  In some cases bleach baths 2-3 times weekly can be helpful to prevent recurrent infection  For severe disease, strong oral medications such as corticosteroids, methotrexate or azathioprine (Imuran) may be needed   These medications require close monitoring and follow-up  You should discuss the risks/ benefits/alternatives of these medications with your health care provider to come up with the best treatment plan for your child  1) Use moisturizer all over the entire body at least THREE TIMES a day  This keeps the skin moisturized to restore the barrier function  Find a cream or ointment that your child likes - this is the most important  The medicines do not work in the bottle  The thicker the moisturizer, generally the better barrier it provides  Ointments often moisturize better than creams; and creams work better than lotions  Lotions are more useful during the summer when thick greasy ointments are uncomfortable  If you put moisturizer on the skin after bathing, while the skin is damp, it is twice as effective  The moisturizer provides a seal holding the water in the skin  You may bathe your child in warm - not hot - water, for short periods of time (no more than 5-10 minutes at a time) once a day if they like  Lightly pat your child dry with a towel and, while the skin is still damp, (within 3 minutes) apply a moisturizer from head to toe  If your child is using a medicated cream, apply it and allow it to absorb completely BEFORE you apply the moisturizer  2) Apply the prescription medication TWICE A DAY to only the red, rough areas on the skin OR AS [place]  Put the medication on your fingers and gently rub it into the areas  Usually the medicine will help an area within a few days time  Try to put the medicine on for two days after you have noticed that the redness is no longer present; this will help the redness from returning  The severity of the rash and the strength and usage of the medication will determine how quickly you see improvement      It is important that you do not overuse steroid creams, and if you notice a thin, shiny appearance to the skin or broken blood vessels, you should stop using the cream and consult your health care provider regarding possible overuse/overthinning of the skin  The face, armpits and groin have particularly thin and sensitive skin and are therefore most at risk for bad results if steroids are over-used in these sites  3) Avoid triggers  Some children have specific things that trigger itching and rashes, while others may have none that can be identified  It may require a little bit of trial and error to see what applies to your child  Also, triggers can change over time for your child  The most common triggers are listed above; start with these  Avoid the use of fabric softeners in the washing machine or dryer sheets (unless they are fragrance-free)  Try to use laundry detergents, soaps and shampoos that are fragrance-free  You may find it helpful to double-rinse your clothes  Some children are sensitive to house dust mites and they may benefit from a plastic mattress wrap  While food allergy is more common in children with eczema, foods are specific triggers for flares in only a small percentage of children  If you notice that the skin flares after certain foods you can see if eliminating one food at time makes a difference, as long as your child can still enjoy a well-balanced diet  4) Consider using a medication like an anti-histamine by mouth to help control the itching  Scratching only makes the skin more reactive and the barrier function even more disrupted  It can cause both children and their parents to lose sleep! There are different types of anti-itch medications  Some cause more drowsiness than others  Both types are acceptable depending on your child and your preference  Start with Benadryl and if that does not work, ask for a prescription “antihistamine ”    5) About the prescription creams:  Corticosteroid creams and ointments (generally things with "-one" or "dale" on the end of their names):   The strength of the cream or ointment depends on the name of the active ingredient  The numbers at the end do not indicate the relative strength  Thus triamcinolone 0 1% ointment, considered a mid-strength corticosteroid, is much stronger than hydrocortisone 1% even though the number following the name is much lower  Topical corticosteroids are very effective in treating atopic dermatitis  When used in the manner prescribed (to rashy areas of skin and for no more than a few weeks at a time to any one area) they are very safe  These are corticosteroids and are anti-inflammatory, not the “anabolic steroids” like those used illegally by some athletes  Topical non-steroid creams and ointments (immunomodulators): These creams and ointments are also called topical calcineurin inhibitors (TCIs)  These include Protopic ointment and Elidel cream  Crisaborole 2% Nikhil Haykyaw) is a prescription ointment that targets an enzyme called PDE4 (phosphodiesterase 4)  It is used on the skin topically to treat mild-to-moderate eczema in adults and children 3years of age and older  In total, these nonsteroidal prescriptions are used to help decrease itching and redness in the skin  They are not as strong as most steroid creams; however, it is believed that they do not thin the skin when overused  They are generally used as second-line medications, though they may be used alone or in conjunction with topical steroids  In sensitive areas such as the face, underarms or groin, they are often recommended  They can sting inflamed skin, but are generally well tolerated once the skin is healing  The FDA placed a “black-box” warning on both Elidel and Protopic in 2006 based on animal studies using the medications  Some animals developed skin cancer and lymphoma  Subsequently, the FDA released a statement that there is no causal relationship between the two medications and cancer    Because of this concern, there are ongoing studies to evaluate this relationship in humans  So far, there are studies that support the safety of these medications  One showed that the rates of cancer in patient using these medications topically were less than the rates of the general population and another showed that in patient's using the medication over a large area of the body, the levels of the medication in the blood was undetectable  As for Eucrisa, this product is only approved for the topical treatment of mild-to-moderate eczema in patients 3years of age and older; use of the medication in kids younger than 2 is considered “off label” and has not been formally studied  Burning and stinging are the most commonly reported side effects of this medication  Rarely, this product has been known to cause hives and hypersensitivity reactions; discontinue its use if you develop severe itching, swelling, or redness in the area of application      Scribe Attestation    I,:  Kaylynn Maya am acting as a scribe while in the presence of the attending physician :       I,:  Aida French MD personally performed the services described in this documentation    as scribed in my presence : Dr Arteaga

## 2023-07-24 NOTE — OB PROVIDER TRIAGE NOTE - NS_ABDFINDING_OBGYN_ALL_OB
Soft, nontender Cephalexin Counseling: I counseled the patient regarding use of cephalexin as an antibiotic for prophylactic and/or therapeutic purposes. Cephalexin (commonly prescribed under brand name Keflex) is a cephalosporin antibiotic which is active against numerous classes of bacteria, including most skin bacteria. Side effects may include nausea, diarrhea, gastrointestinal upset, rash, hives, yeast infections, and in rare cases, hepatitis, kidney disease, seizures, fever, confusion, neurologic symptoms, and others. Patients with severe allergies to penicillin medications are cautioned that there is about a 10% incidence of cross-reactivity with cephalosporins. When possible, patients with penicillin allergies should use alternatives to cephalosporins for antibiotic therapy.

## 2024-01-22 NOTE — ASU PREOP CHECKLIST - LOOSE TEETH
Discharge Information     Discharge Date:   1/21/2024    Primary Discharge Diagnosis:  Subdural hematoma/fall injury      Discharge Summary:  Reviewed      Medication & Order Review     Were medication changes made or new medications added?   No    If so, has the patient filled the prescriptions?  N/a     Was Home Health ordered? No    If so, has Home Health contacted patient and/or initiated services?  No    Name of Home Health Agency? N/A    Durable Medical Equipment ordered?  No     If so, has the DME provider contacted patient and delivered equipment?  N/A    Follow Up               Any problems since discharge? No    How is the patient feeling since returning home?  Reports some discomfort from the fall.     Have you set up recommended follow up appointments? JOSE Carter. on 2/1/24        Notes:  no complaints, no med changes, kept for observation, and told to follow up outpt. Patient is aware to report back to ER for any sudden onset dizziness.             Joaquina Freedman     no
